# Patient Record
Sex: FEMALE | Race: WHITE | NOT HISPANIC OR LATINO | URBAN - METROPOLITAN AREA
[De-identification: names, ages, dates, MRNs, and addresses within clinical notes are randomized per-mention and may not be internally consistent; named-entity substitution may affect disease eponyms.]

---

## 2020-07-28 ENCOUNTER — OFFICE VISIT (OUTPATIENT)
Dept: FAMILY MEDICINE | Facility: CLINIC | Age: 30
End: 2020-07-28
Payer: COMMERCIAL

## 2020-07-28 VITALS
OXYGEN SATURATION: 98 % | BODY MASS INDEX: 43.78 KG/M2 | HEART RATE: 94 BPM | DIASTOLIC BLOOD PRESSURE: 93 MMHG | WEIGHT: 223 LBS | HEIGHT: 60 IN | SYSTOLIC BLOOD PRESSURE: 127 MMHG

## 2020-07-28 DIAGNOSIS — C73 THYROID CANCER (H): ICD-10-CM

## 2020-07-28 DIAGNOSIS — R41.840 LACK OF CONCENTRATION: ICD-10-CM

## 2020-07-28 DIAGNOSIS — R45.89 SADNESS: ICD-10-CM

## 2020-07-28 DIAGNOSIS — E89.0 HYPOTHYROIDISM ASSOCIATED WITH SURGICAL PROCEDURE: Primary | ICD-10-CM

## 2020-07-28 DIAGNOSIS — F41.9 ANXIETY: ICD-10-CM

## 2020-07-28 DIAGNOSIS — R41.3 LOSS OF MEMORY: ICD-10-CM

## 2020-07-28 RX ORDER — PHENOL 1.4 %
AEROSOL, SPRAY (ML) MUCOUS MEMBRANE
COMMUNITY
Start: 2019-01-10 | End: 2020-08-10

## 2020-07-28 RX ORDER — ALBUTEROL SULFATE 90 UG/1
2 AEROSOL, METERED RESPIRATORY (INHALATION)
COMMUNITY
Start: 2018-08-13

## 2020-07-28 RX ORDER — CALCITRIOL 0.25 UG/1
CAPSULE, LIQUID FILLED ORAL
COMMUNITY
Start: 2020-07-21 | End: 2020-08-10

## 2020-07-28 ASSESSMENT — PAIN SCALES - GENERAL: PAINLEVEL: EXTREME PAIN (8)

## 2020-07-28 ASSESSMENT — MIFFLIN-ST. JEOR: SCORE: 1658.02

## 2020-07-28 NOTE — PROGRESS NOTES
HPI       Wanda Swanson is a 29 year old woman who presents with a history of thyroid cancer. She stopped the Synthroid 4 days ago, memory, focus, depression and anxiety have improved. She feels that these symptoms were related to her medication. Last Thyroid labs in February, 2020; TSH 0.15 and Free T4 1.5. She presents for discussion and to have a referral to Endocrinology.   Chief Complaint   Patient presents with     Endocrine Problem     Requesting referral to Endo.     Recheck Medication     Pt is having side of effect after taking synthroid med.     Problem, Medication and Allergy Lists were reviewed and updated if needed..    Patient is an established patient of this clinic..           Review of Systems:   Review of Systems     Constitutional:  Negative for fever, chills and fatigue.   Psychiatric/Behavioral:  Positive for depression and decreased concentration.                Physical Exam:     Vitals:    07/28/20 1559   BP: (!) 127/93   Pulse: 94   SpO2: 98%   Weight: 101.2 kg (223 lb)   Height: 1.524 m (5')     Body mass index is 43.55 kg/m .  Vitals were reviewed and were normal     Physical Exam  Vitals signs reviewed.   Constitutional:       Appearance: Normal appearance.   HENT:      Head: Normocephalic.   Musculoskeletal: Normal range of motion.   Skin:     General: Skin is warm and dry.   Neurological:      General: No focal deficit present.      Mental Status: She is alert and oriented to person, place, and time.   Psychiatric:         Mood and Affect: Mood normal.         Behavior: Behavior normal.         Results:     No diagnostics ordered today.     Assessment and Plan     1. Hypothyroidism associated with surgical procedure    - ENDOCRINOLOGY ADULT REFERRAL    2. Thyroid cancer (H)    - ENDOCRINOLOGY ADULT REFERRAL    3. Loss of memory      4. Lack of concentration      5. Sadness      6. Anxiety    There are no discontinued medications.  First, Wanda will see Endocrinology in consult.  Next, she will follow up as needed. Options for treatment and follow-up care were reviewed with the patient. Wanda Swanson  engaged in the decision making process and verbalized understanding of the options discussed and agreed with the final plan.  AQUILINO Chandra, CNP

## 2020-07-28 NOTE — NURSING NOTE
Chief Complaint   Patient presents with     Endocrine Problem     Requesting referral to Endo.     Recheck Medication     Pt is having side of effect after taking synthroid med.     Depression Response    Patient completed the PHQ-9 assessment for depression and scored >9? No  Question 9 on the PHQ-9 was positive for suicidality? No    I personally notified the following: visit provider     SYLVIE Austin 4:07 PM  7/28/2020

## 2020-07-28 NOTE — PATIENT INSTRUCTIONS

## 2020-07-31 NOTE — TELEPHONE ENCOUNTER
RECORDS RECEIVED FROM:    DATE RECEIVED: 8.10.20    NOTES (FOR ALL VISITS) STATUS DETAILS   OFFICE NOTES from referring provider Internal  alvarado Mullen    OFFICE NOTES from other specialist ce paramjit- yumiko espinosa 7.29.19   ED NOTES na    OPERATIVE REPORT  (thyroid, pituitary, adrenal, parathyroid)     MEDICATION LIST Internal     IMAGING      DEXASCAN na    MRI (BRAIN) na    XR (Chest) ce [facility]- 8.18.18    CT (HEAD/NECK/CHEST/ABDOMEN) na    NUCLEAR  na    ULTRASOUND (HEAD/NECK) ce [facility]- 8.8.19, 8.27.18    LABS     DIABETES: HBGA1C, CREATININE, FASTING LIPIDS, MICROALBUMIN URINE, POTASSIUM, TSH, T4    THYROID: TSH, T4, CBC, THYRODLONULIN, TOTAL T3, FREE T4, CALCITONIN, CEA Internal/CE TSH- 2.11.20   T4- 2.11.20  Cbc-8.27.19  THYRODLONULIN 9.3.19

## 2020-08-03 ASSESSMENT — ENCOUNTER SYMPTOMS
FATIGUE: 0
FEVER: 0
DEPRESSION: 1
CHILLS: 0
NERVOUS/ANXIOUS: 1
DECREASED CONCENTRATION: 1

## 2020-08-06 RX ORDER — LEVOTHYROXINE SODIUM 150 UG/1
150 TABLET ORAL DAILY
COMMUNITY
Start: 2020-02-18

## 2020-08-06 RX ORDER — OMEPRAZOLE 40 MG/1
40 CAPSULE, DELAYED RELEASE ORAL DAILY
COMMUNITY
Start: 2019-05-06 | End: 2020-08-10

## 2020-08-06 NOTE — PROGRESS NOTES
"Wanda Swanson is a 29 year old female who is being evaluated via a billable video visit.      The patient has been notified of following:     \"This video visit will be conducted via a call between you and your physician/provider. We have found that certain health care needs can be provided without the need for an in-person physical exam.  This service lets us provide the care you need with a video conversation.  If a prescription is necessary we can send it directly to your pharmacy.  If lab work is needed we can place an order for that and you can then stop by our lab to have the test done at a later time.    Video visits are billed at different rates depending on your insurance coverage.  Please reach out to your insurance provider with any questions.    If during the course of the call the physician/provider feels a video visit is not appropriate, you will not be charged for this service.\"    Patient has given verbal consent for Video visit? Yes    How would you like to obtain your AVS? MyChart     Will anyone else be joining your video visit? No    Video-Visit Details    Type of service:  Video Visit    Distant Location (provider location):  [facility] ENDOCRINOLOGY     Hayley Hodge MA          "

## 2020-08-10 ENCOUNTER — PRE VISIT (OUTPATIENT)
Dept: ENDOCRINOLOGY | Facility: CLINIC | Age: 30
End: 2020-08-10

## 2020-08-10 ENCOUNTER — VIRTUAL VISIT (OUTPATIENT)
Dept: ENDOCRINOLOGY | Facility: CLINIC | Age: 30
End: 2020-08-10
Attending: NURSE PRACTITIONER
Payer: COMMERCIAL

## 2020-08-10 DIAGNOSIS — E66.01 CLASS 3 SEVERE OBESITY WITH BODY MASS INDEX (BMI) OF 40.0 TO 44.9 IN ADULT, UNSPECIFIED OBESITY TYPE, UNSPECIFIED WHETHER SERIOUS COMORBIDITY PRESENT (H): ICD-10-CM

## 2020-08-10 DIAGNOSIS — E66.813 CLASS 3 SEVERE OBESITY WITH BODY MASS INDEX (BMI) OF 40.0 TO 44.9 IN ADULT, UNSPECIFIED OBESITY TYPE, UNSPECIFIED WHETHER SERIOUS COMORBIDITY PRESENT (H): ICD-10-CM

## 2020-08-10 DIAGNOSIS — E89.0 POST-SURGICAL HYPOTHYROIDISM: ICD-10-CM

## 2020-08-10 DIAGNOSIS — Z56.9 PROBLEMS AT WORK: ICD-10-CM

## 2020-08-10 DIAGNOSIS — C73 PAPILLARY THYROID CARCINOMA (H): Primary | ICD-10-CM

## 2020-08-10 DIAGNOSIS — E89.2 HYPOPARATHYROIDISM AFTER PROCEDURE (H): ICD-10-CM

## 2020-08-10 RX ORDER — PHENOL 1.4 %
4 AEROSOL, SPRAY (ML) MUCOUS MEMBRANE DAILY
COMMUNITY
Start: 2020-08-10

## 2020-08-10 RX ORDER — CALCITRIOL 0.25 UG/1
0.5 CAPSULE, LIQUID FILLED ORAL DAILY
COMMUNITY
Start: 2020-08-10 | End: 2020-09-24

## 2020-08-10 SDOH — ECONOMIC STABILITY - INCOME SECURITY: UNSPECIFIED PROBLEMS RELATED TO EMPLOYMENT: Z56.9

## 2020-08-10 NOTE — PATIENT INSTRUCTIONS
Change calcium to one 600 mg tablet three times/day with food    Change calcitriol to 0.25 mcg twice/day    Gets labs at [facility] - my staff will contact you to help set it up

## 2020-08-10 NOTE — LETTER
"8/10/2020       RE: Wanda Swanson  [address]  [facility] [address]     Dear Colleague,    Thank you for referring your patient, Wanda Swanson, to the [facility] ENDOCRINOLOGY at [facility]. Please see a copy of my visit note below.    Wanda Swanson is a 29 year old female who is being evaluated via a billable video visit.      The patient has been notified of following:     \"This video visit will be conducted via a call between you and your physician/provider. We have found that certain health care needs can be provided without the need for an in-person physical exam.  This service lets us provide the care you need with a video conversation.  If a prescription is necessary we can send it directly to your pharmacy.  If lab work is needed we can place an order for that and you can then stop by our lab to have the test done at a later time.    Video visits are billed at different rates depending on your insurance coverage.  Please reach out to your insurance provider with any questions.    If during the course of the call the physician/provider feels a video visit is not appropriate, you will not be charged for this service.\"    Patient has given verbal consent for Video visit? Yes    How would you like to obtain your AVS? MyChart     Will anyone else be joining your video visit? No    Video-Visit Details    Type of service:  Video Visit    Distant Location (provider location):  [facility] ENDOCRINOLOGY     Hayley Hodge MA            Endocrine Consult Video visit note-   Attending Assessment/Plan :   Papillary thyroid carcinoma. She has been treated with total Tx and limited LN dissection, 131I 50 mCi.   Yearly labs to include Tg    Post surgical hypothyroidism. Treat to target TSH < 0.4 .    Hypocalcemia due to post surgical hypoparathyroidism.  Her regimen is unusual including both the calcitriol and the single daily of  calcium 2400 mg .  Treat to low normal or " "slightly below normal calcium.   I will try to reduce both the calcitriol and calcium if I can .  Change calcium to 600 tid with meals  Tissue transglutaminse antibody     Memory /work performance concern. She is having work performance issue which are not necessarily memory issue as much as transcription inaccuracy and \"zoning out\" all which she says is new since the thyroid surgery.    Recommend PCP consider sending her for neuropsych evaluation.   She is a little inappropriate in her affect.      Obesity with rapid weight gain in the last 10 years.   She has had a near 100 lbs weight gain since 2010  Consider sleep evaluation -   Labs to include cortisol and HgbA1c     Sleep interruption by frequent nocturia .    Labs to include HgbA1c    History of high TPO, + ROMEL  - As above.   TTG    Medication noncompliance is apparent in her calcium administration.  Unclear what is happening with the LT4 as she had reported stopping it on 7/28 which she did not tell me today.      Due to the COVID 19 pandemic this visit was a video visit in order to help prevent spread of infection in this high risk patient and the general population. The patient gave verbal consent for the visit today.    Start time 1600  Stop time 1625  Total time 25 minutes    Ethel Bailon MD    Chief complaint:  Wanda is a 29 year old female seen in consultation at the request of Cindy Mullen NP for hypothyroidism. I have reviewed Care Everywhere including [facility] lab reports, imaging reports and provider notes as indicated.      HISTORY OF PRESENT ILLNESS  Wanda pesents along with her  today. She notes she has had \"thyroid issues\" x 8 years.   She says she has seen 6 endocrinologist.  The record shows she has see Ligia Suggs, Loretta, Nathen Arana, Henrik  They haven't all told her the same thing.   She doesn't know what to believe. She has been having problems at work.  She wants to get the brain fog figured out.  " Her boss told her to see a doctor because they don't believe her work problem is due to the thyroid.  Her  is concerned about her memory. Her  notes she forgets what he says to her.   She is reading numerics wrong at work , such as to Read/key  as 20 and it was 70.  She has noticed this problem more in the past year, not in grade school or high school.  She sometimes zones off at work - others haven't noticed so they don't believe that is what is happeneing.       I have reviewed all TFTS available, including on care everywhere , which date to 2008.    Her thyroid course has been as follows:  12/1/11 FNAB right thyroid nodule papillary thyroid carcinoma  1/20/12 total thyroidectomy, limited CND (Dr Mayo Archer). Tumor was PCT 3.8 cm, unifocal with LV invasion.  0/2 lN negative.    3/14/12 50 mCi 131I [facility]  4/17/12 FNAB - reactive lN    She she currently on Synthroid brand. She has been on this dose 1-2 years.  She used to be on generic LT4.  She liked Synthroid 200 better - didn't have as much anxiety or memory issue then. The 7/28/2020 primary care note states she STOPPED the LT4 4 days prior.  Wanda did not report this to me today and I didn't catch this until after the appt.     Selected TFTS  9/11/2008 TSH 2.2  9/28/2008 TSH 1.77, free T4 1.3, .3  11/29/11 TSH 2.48, free T4 1.2, .3, 25OHD 7.9, PTH 63  SURGERY   3/1/12 TSH 2.62, free T4 1.6, ROMEL 34, Tg < 0.1, 25 OHD 13.2, PTH < 6  3/16/12 ROMEL 26, Tg 1.8   7/3/12 25OHD 29.3  8/3/12 ROMEL < 20, Tg < 0.1, TSH 2.93, free T4 1.53  10/18/12 25OHD 33.4  8/28/13 TA < 10, Tg < 0.1, TSH 0.1, free T4 1.38  9/15/14 ROMEL 49, ROMEL < 9.1, TSH 0.03, free T4 1.92  7/21/16 TSH 2.106, Tg 0.1, ROMEL < 1, PTH 4.5  10/10/16 TSH 12.31, free T4 1.2  8/28/17 Ca 8.1, TSH 0.13, free T4 1.7, Tg 0.1  12/20/17 ESR 36  1/7/19 PTH < 7  3/19/19 24 hour urine calcium 129 mg/24 hours, volume 1075, urine creatinine   7/22/19 Tg 0.3, ROMEL 1.8, TSH 5.58, free T4  1.21  8/27/19 25OHD 72.2  9/3/19 TSH 0.22, free T4 1.95, ROMEL 1.2, Tg 0.1  7/22/19 Tg 0.3, ROMEL 1.8, TSH 5.58, free T4 1.21  8/27/19 25OHD 72.2  9/13/19  Tg 0.1, ROMEL 1.2Ca 8.2, creatinine 0.92, TSH 0.22, free T4 1.95   9/17/19 Ca 8.3, Mg 1.9  2/11/2020 TSH 0.15, free T4 1.5, Ca 8.5  2/18/2020 B12 471, ferritin 40.5    Imaging--none of the images are on pacs.   9/11/08 thyroid US [facility]  9/28/10 thyroid US [facility]  11/1/11 thyroid US [facility]   12/1/11 FNAB [facility]  3/14/12 0.269 mCi 123I TBS 24 hour uptake 0.2%; irregular uptake thyroid bed  3/23/12 post 131I TBS : uptake right angle of mandible suspicious for lN met; residual thyroid   4/3/12  Neck US [facility]  4/17/12 FNAB   7/21/16 neck [facility]  6/18/18 brain MRI  ([facility])  Negative   8/27/18 neck [facility]  10/11/18 pelvic US [facility]   3/11/19 CT abdomen : stool ;   8/8/19 neck US [facility] normal     Weights  9/28/10 132  1/20/12 142  8/27/12 150 BMI 30.3   5/28/13 177  7/27/15 185  BMI 36.1  8/28/17 184 lbs BMI 35.9  9/17/19 221  BMI 43  7/28/2020 223#    REVIEW OF SYSTEMS  Can't say when last well  Weight gain in the last 8 years  Bedtime 830-9 PM  Up at 0615;   Sleep is interrupted - nocturia every 1-2 hours ; small voumes ; no water drinking during the night.    Don't drink that much, not thirsty;   Skin bruises easily  Heat sensitiivty x 8 years -   Gets cold flashes and getrs extremely hot.    Cardiac: panic attack feeling;  says she has reported heart racing/lshe says she felt panic  Respiratory: BURROUGHS ; can't exercise recently; endometriosis pain is alleviated by CBD oil she lst tried 3 days ago.  She has dysmenorrhea from 10 to 2 on CGD oil  GI: + all agove; always naueated; stomch always in pain x a few years. ? IBS per PCP    10 system ROS otherwise as per the HPI or negative    Past Medical History  Past Medical History:   Diagnosis Date     Endometriosis 06/2019    surgically treated 6/19     STEPHANIE (generalized anxiety disorder) 03/2019      Hypocalcemia 01/21/2012    post surgical     Hypoparathyroidism (H) 01/20/2012    post surgical     Hypovitaminosis D 2011     Panic disorder 03/2019     Papillary thyroid carcinoma (H) 01/20/2012     Post-surgical hypothyroidism 1/202012       Past Surgical History:   Procedure Laterality Date     LAPAROSCOPY DIAGNOSTIC (GYN)  06/18/2019    with destruction of endometriosis     THYROIDECTOMY  Bilateral 01/2012     Diagnostic laparoscopy with destruction of endometrioiss 6/18/19    Medications  Current Outpatient Medications   Medication Sig Dispense Refill     albuterol (PROAIR HFA/PROVENTIL HFA/VENTOLIN HFA) 108 (90 Base) MCG/ACT inhaler Inhale 2 puffs into the lungs PRN       calcitRIOL (ROCALTROL) 0.25 MCG capsule Take 2 capsules (0.5 mcg) by mouth daily       calcium carbonate (OS-DENIZ 600 MG [place]. [place]) 600 MG tablet Take 4 tablets (2,400 mg) by mouth daily       levothyroxine (SYNTHROID) 150 MCG tablet Take 150 mcg by mouth daily       She self disconitinued omeprazole.  She used to take thyroid support from [employer].  Tylenol prn  Ibuprofen prn      Allergies  Allergies   Allergen Reactions     Penicillins Unknown and Anaphylaxis     Family history       Molds & Smuts GI Disturbance     Causes nausea and can lead to vomiting        Family History  family history includes Cancer in her maternal grandmother; Cerebrovascular Disease in her maternal grandmother; Depression in her brother and father.    Social History  Social History     Tobacco Use     Smoking status: Never Smoker     Smokeless tobacco: Never Used   Substance Use Topics     Alcohol use: Yes     Drug use: None     She is working physically at [employer] work , more than just .  Errors  Eats in the morning on the way to work - donut while driving;    Physical Exam  There were no vitals taken for this visit.  There is no height or weight on file to calculate BMI.  GENERAL: no distress; she is obese;  is present and is  not obese  SKIN: Visible skin clear. No significant rash, abnormal pigmentation or lesions.  EYES: Eyes grossly normal to inspection.  No discharge or erythema, or obvious scleral/conjunctival abnormalities.  RESP: No audible wheeze, cough, or visible cyanosis.  No visible retractions or increased work of breathing.    NEURO: Cranial nerves grossly intact.  Awake, alert, responds appropriately to questions.  Mentation and speech fluent.  PSYCH:affect cheerful but possibly inappropriately so, and appearance well-groomed.    DATA REVIEW    1/20/12 SURGICAL PATHOLOGY REPORTLAB:  Phone:Case#:[id number]             Final Report  DIAGNOSIS  A) CENTRAL LYMPH NODE VERSUS PARATHYROID, BIOPSY: One lymph node negative  or malignancy (0/1  B) LYMPH NODE, RIGHT THYROID, BIOPSY: One lymph node negative formalignancy (0/1)  C) LYMPH NODE, LEFT THYRIOD, BIOPSY: One lymph node negative for malignancy(0/1)  D) THYROID, THYROIDECTOMY:  1. Papillary carcinoma  2. Tumor abuts cauterized margin focally  3. Two lymph nodes negative for malignancy (0/2)  4. One benign parathyroid  5. Lymphocytic thyroiditis  6. Please see below thyroid cancer staging parameters    E) LYMPH NODES, CENTRAL, BIOPSY: Three lymph nodes negative for malignancy (0/3)  F) LYMPH NODE, RIGHT THYROID, BIOPSY: Benign thyroid tissue    COMMENT  The dominant nodule in the right measured 0.8 x 0.8 x 0.7 cm. However,  tumor is present in blocks D1-D14 and the overall size of the tumor is  estimated to be 3.8 cm. No tumor is identified in the left thyroid.    2010 THYROID CANCER STAGING PARAMETERS  Case number: [id number]    Patient name: Wanda Madden    Final TNM: [id number]  2010 stage: I    MACROSCOPIC     SPECIMEN TYPE          Total thyroidectomy     DOMINANT TUMOR LOCATION AND SIZE          Location: Right lobe          Greatest dimension: 3.8 cm     TUMOR FOCALITY          Unifocal    MICROSCOPIC     HISTOLOGIC TYPE          Papillary carcinoma     MARGINS          " Uninvolved by invasive carcinoma          (cauterized tumor abuts inked margin)     LYMPHATIC VASCULAR INVASION          Present          Extent: Focal     EXTRATHYROIDAL EXTENSION          Not identified    PATHOLOGIC STAGING     EXTENT OF INVASION          pT2.  (Tumor more than 2 cm but not more than 4 cm, limited to the  thyroid)     REGIONAL LYMPH NODES          pN0.  (No regional lymph node metastasis)          Total nodes:              8          Total positive nodes:     0     DISTANT METASTASIS          pM0. (Not applicable)     MACIS SCORE          4.2     PATHOLOGIC STAGE Summary          Final TNM: [id number]          2010 stage: I          **  The pathologic stage presumes no distant metastasis.    Attending Pathologist:   Pelon Garcia M.D.    [facility], [email or web address]    CLINICAL INFORMATION  Papillary cancer of the thyroid (see previous pathology report [id number]).    SPECIMEN/GROSS DESCRIPTION  A) SOURCE: Central lymph node versus parathyroid  Received fresh labeled with the patient name and \"central lymph node versus  parathyroid\" is a 0.010 gram, 0.3 cm tan tissue. The tissue is entirely  frozen on one bridget and submitted in A.    INTRAOPERATIVE PATHOLOGY CONSULTATION WITH FROZEN SECTION: \"Benign lymph  node\" is rendered by Dr. Krishna.    KIMBERLY:ndm:1/20/12    B) SOURCE: Lymph node biopsy, right thyroid  Labeled with the patient's name and \"Rt thyroid lymph nodes B\" are multiple  tan-pink tissues, up to 0.5 cm and 0.7 x 0.7 x 0.3 cm in aggregate. The  specimen is entirely submitted in one cassette.    C) SOURCE: Lymph node biopsy, left thyroid  Labeled with the patient's name and \"left thyroid lymph nodes\" are two  tan-pink tissues: 0.9 x 0.8 x 0.3 cm and 1 x 0.5 x 0.3 cm. The specimen is  entirely submitted in one cassette.    D) SOURCE: Thyroid  Labeled with the patient's name and \"thyroid stitch on right superior\" is a  19 gram total thyroidectomy specimen. The specimen is " "oriented as follows:  Suture - right superior.    The specimen has dimensions as follows: Right lobe - 4.9 x 3 x 2.6 cm, left  lobe - 4.3 x 2 x 1.8 cm, isthmus - 1.6 x 1 x 0.7 cm.    The specimen is inked as follows:  Anterior right lobe -- Green  Anterior left lobe -- Blue  Anterior isthmus -- Orange  Posterior -- Black    There is a 0.8 x 0.8 x 0.7 cm gray indurated thyroid lesion involving the  mid right thyroid lobe. The thyroid lesion is 0.7 cm from the closest inked  outer surface. The remaining right thyroid lobe has a multinodular cut  surface. No other lesions are identified grossly.    The left thyroid lobe has a tan-red cut surface. The left thyroid lobe has  a vague multinodular cut surface. No discrete masses or lesions are  identified.    No parathyroid tissue is identified. No lymph nodes are identified.    The specimen is entirely submitted labeled:  D1-D17.   Entire right lobe submitted from superior to inferior (D5-D9 -            includes thyroid lesion with a composite slice in D8-D9; D10-            D11, D12-D13 - composite slices)  D18-D29.  Entire left lobe submitted from superior to inferior    E) SOURCE: Lymph node biopsy, central  Labeled with the patient's name and \"central lymph nodes\" is a 1.5 x 1 x  0.5 cm aggregate of fibrofatty tissue. There are three possible lymph  nodes, ranging from 0.4 cm to 0.6 cm.    The specimen is entirely submitted labeled:  E1.  Three possible lymph nodes  E2.  Remaining fibrofatty tissue    F) SOURCE: Lymph node biopsy, additional right thyroid lymph nodes  Labeled with the patient's name and \"F additional Rt thyroid lymph nodes\"  is a 0.6 x 0.5 x 0.3 cm red tissue. The specimen is entirely submitted in  one cassette.    This case is accessioned in [employer].    Gross dictation by: [email or web address] (B-F)    MICROSCOPIC  A-F) The microscopic appearance substantiates the diagnosis. Dictation by:  ALTAGRACIA; transcribed by:vernon [email or web address]    Interpreted at [facility] (Central Lab, [facility], [facility], [facility], [facility])    COLLECTED: 01/20/12  ACCESSIONED: 01/20/12  SIGNED: 01/23/12 16:57    CYTOPATHOLOGY REPORT  LAB:  [facility]  Phone:  [phone]     Fax: [phone]    Case#:[id number]               Final Report      4/17/12 DIAGNOSIS  LYMPH NODE, RIGHT CERVICAL AT ANGLE OF MANDIBLE, ULTRASOUND GUIDED FINE  NEEDLE ASPIRATION:  1. Reactive-appearing lymph node  2. Negative for metastatic carcinoma in this sampling  Attending Pathologist:   Alesha Stoner M.D  [facility], [email or web address]  CLINICAL INFORMATION  21-year-old woman with a total thyroidectomy earlier this year forpapillary carcinoma ([id number], 1/20/12) now has a nuclear medicine studydemonstrating some I131 uptake at the right angle of the mandible,suspicious for a lymph node metastasis of thyroid carcinoma.  SPECIMEN/GROSS DESCRIPTION  A) SOURCE: FNA, radiologic - guided, rt l.n @ angle of mandibleDr. Hassan performed the procedure, with assistance from Cindy Grayson. Thisyielded 16 Diff-Quik stained direct smears and material in CytoLyt fixative, used to prepare a single Pap stained ThinPrep slide.  MICROSCOPICSpecimen adequacy: Adequate for interpretation. The smears demonstrateabundant lymph node elements, with frequent lymphohistiocytic tangles, consistent with germinal centers. The smears are screened for evidence of  metastatic carcinoma and this is not identified.  Dictation by: MPC;transcribed by:carolee 4/20/1  COLLECTED: 04/17/12  ACCESSIONED: 04/19/12  SIGNED: 04/20/12 14:30\      12/1/11  CYTOPATHOLOGY REPORT  LAB:  [facility]  Phone:  [phone]     Fax: [phone]    Case#:[id number]               Final Report    DIAGNOSIS    THYROID, RIGHT LOBE, ULTRASOUND GUIDED FINE NEEDLE ASPIRATION:  1. Papillary carcinoma  2. See microscopic description and comment    Attending Pathologist:   Stevan TYSON  Lefty SHORE    [facility], [email or web address]    CLINICAL INFORMATION  This is a 21-year old female with a right thyroid nodule.    SPECIMEN/GROSS DESCRIPTION  A) SOURCE: FNA, radiologic - guided, right thyroid  Received are five air dried slides, stained with Diff-Quik stain and one  ThinPrep Papanicolaou stained slide prepared from needle rinsings and  Cytolyt fluid. The procedure is performed by Dr. Cutler.    MICROSCOPIC  Specimen adequacy: Adequate for interpretation.  The microscopic appearance substantiates the diagnosis. Dictation by: TM;  transcribed by: sb:12/2/11    [facility] at [facility] (Central Lab, [facility], [facility], [facility], [facility]    COLLECTED: 12/01/11  ACCESSIONED: 12/02/11  SIGNED: 12/02/11 15:01         NM THYROID CA METS SCAN WHOLE BODY3/23/2012  [facility]  Result Impression     1. Successful localization and residual thyroid for ablation.  2. Uptake in a right angle of the mandible nodule suspicious for a lymph   node metastasis. Clinical and ultrasound follow up recommended.     Saud Gray Jr., M.D., Ph.D.  Nuclear Medicine, Breast Imaging  [facility]  [email or web address]   RRB/ilanam  D:3/22/2012/T:3/22/2012   Result Narrative   THYROID CANCER WHOLE BODY SCAN POST I-131 THERAPY AND SPECT TUMOR   LOCALIZATION 3/21/2012     COMPARISON: Pretreatment whole body I-123 imaging 3/14/2012.    INDICATION: Thyroid carcinoma post thyroidectomy, I-131 ablation therapy   performed on 3/14/2012 and I-131 post-therapy whole body imaging performed   at one week post dose administration on 3/21/2012 for staging.     TECHNIQUE: One week post I-131 ablation therapy imaging with anterior   whole body and lateral head and neck images. Due to the findings on the   planar images, patient returned to the department on 3/22/2012 and SPECT   imaging of the neck performed for tumor localization  with CT attenuation   correction to improve sensitivity and SPECT/CT image fusion for functional   anatomic correlation.     FINDINGS: Planar imaging shows some accumulation in the thyroid bed as   desired for ablation of any remnant thyroid tissue. There is focal   accumulation in the right neck above the thyroid bed. This area was not   included on the pinhole imaging of the pretreatment study. This is at the   level of the submandibular gland; however, on the lateral view, this is   more posteriorly positioned and, therefore, SPECT imaging is required for   further localization. On the planar whole body images, there is otherwise   activity in the liver, suggesting uptake by some residual functioning   thyroid tissue, and no other focal abnormal accumulation to suggest   distant metastases.     SPECT imaging on 3/22/2012 with low-dose CT fusion shows the focal   accumulation to lie just posterior and deep to the angle of the mandible   along the anterior margin of the sternocleidomastoid muscle. Some   asymmetric soft tissue density is suggested on the CT localizer images.   This could represent a metastasis to a level-2 cervical lymph node just   above the level of the hyoid bone. clinical attention is drawn to this   area for a possible palpable node, and ultrasound could be utilized for   further imaging characterization. No other focal abnormal accumulation   identified.      US NECK OR HEAD SOFT TISSUE8/8/2019  [facility]  Other Result Information   This result has an attachment that is not available.   Result Narrative     Clinical History: Localized Swelling, Mass And Lump, Neck    Technique: Ultrasound of the neck.    Comparison: None.    Findings: Normal neck structures are seen. No masses or enlarged nodes are seen. Scattered normal-appearing lymph nodes are present.    Impression: Normal neck ultrasound.       Again, thank you for allowing me to participate in the care  of your patient.      Sincerely,    Ethel Bailon MD

## 2020-08-10 NOTE — PROGRESS NOTES
"Endocrine Consult Video visit note-   Attending Assessment/Plan :   Papillary thyroid carcinoma. She has been treated with total Tx and limited LN dissection, 131I 50 mCi.   Yearly labs to include Tg    Post surgical hypothyroidism. Treat to target TSH < 0.4 .  Addendum : 8/20/2020 TFTS not at target.      Hypocalcemia due to post surgical hypoparathyroidism.  Her regimen is unusual including both the calcitriol and the single daily of  calcium 2400 mg .  Treat to low normal or slightly below normal calcium.   I will try to reduce both the calcitriol and calcium if I can .  Change calcium to 600 tid with meals  Tissue transglutaminse antibody   Addendum: 8/20/2020 calcium normal supports we can reduce treatment some.  Will start by reducing calcitriol from 0.5 to 0.25 mcg/day.     Memory /work performance concern. She is having work performance issue which are not necessarily memory issue as much as transcription inaccuracy and \"zoning out\" all which she says is new since the thyroid surgery.    Recommend PCP consider sending her for neuropsych evaluation.   She is a little inappropriate in her affect.      Obesity with rapid weight gain in the last 10 years.   She has had a near 100 lbs weight gain since 2010  Consider sleep evaluation -   Labs to include cortisol and HgbA1c     Sleep interruption by frequent nocturia .    Labs to include HgbA1c    History of high TPO, + ROMEL  - As above.   TTG    Medication noncompliance is apparent in her calcium administration.  Unclear what is happening with the LT4 as she had reported stopping it on 7/28 which she did not tell me today.      Due to the COVID 19 pandemic this visit was a video visit in order to help prevent spread of infection in this high risk patient and the general population. The patient gave verbal consent for the visit today.    Start time 1600  Stop time 1625  Total time 25 minutes    Ethel Bailon MD    Chief complaint:  Wanda is a 29 year old " "female seen in consultation at the request of Cindy Mullen NP for hypothyroidism. I have reviewed Care Everywhere including [facility], [facility] lab reports, imaging reports and provider notes as indicated.      HISTORY OF PRESENT ILLNESS  Wanda fox along with her  today. She notes she has had \"thyroid issues\" x 8 years.   She says she has seen 6 endocrinologist.  The record shows she has see Dr Hartman ,  Ligia, Loretta, Sumit, Nathen, Henrik  They haven't all told her the same thing.   She doesn't know what to believe. She has been having problems at work.  She wants to get the brain fog figured out.  Her boss told her to see a doctor because they don't believe her work problem is due to the thyroid.  Her  is concerned about her memory. Her  notes she forgets what he says to her.   She is reading numerics wrong at work , such as to Read/key  as 20 and it was 70.  She has noticed this problem more in the past year, not in grade school or high school.  She sometimes zones off at work - others haven't noticed so they don't believe that is what is happeneing.       I have reviewed all TFTS available, including on care everywhere , which date to 2008.    Her thyroid course has been as follows:  12/1/11 FNAB right thyroid nodule papillary thyroid carcinoma  1/20/12 total thyroidectomy, limited CND (Dr Mayo Archer). Tumor was PCT 3.8 cm, unifocal with LV invasion.  0/2 lN negative.    3/14/12 50 mCi 131I [facility]  4/17/12 FNAB - reactive lN    She she currently on Synthroid brand. She has been on this dose 1-2 years.  She used to be on generic LT4.  She liked Synthroid 200 better - didn't have as much anxiety or memory issue then. The 7/28/2020 primary care note states she STOPPED the LT4 4 days prior.  Wanda did not report this to me today and I didn't catch this until after the appt.     Selected TFTS  9/11/2008 TSH 2.2  9/28/2008 TSH 1.77, free T4 1.3, .3  11/29/11 TSH 2.48, free " T4 1.2, .3, 25OHD 7.9, PTH 63  SURGERY   3/1/12 TSH 2.62, free T4 1.6, ROMEL 34, Tg < 0.1, 25 OHD 13.2, PTH < 6  3/16/12 ROMEL 26, Tg 1.8   7/3/12 25OHD 29.3  8/3/12 ROMEL < 20, Tg < 0.1, TSH 2.93, free T4 1.53  10/18/12 25OHD 33.4  8/28/13 TA < 10, Tg < 0.1, TSH 0.1, free T4 1.38  9/15/14 ROMEL 49, ROMEL < 9.1, TSH 0.03, free T4 1.92  7/21/16 TSH 2.106, Tg 0.1, ROMEL < 1, PTH 4.5  10/10/16 TSH 12.31, free T4 1.2  8/28/17 Ca 8.1, TSH 0.13, free T4 1.7, Tg 0.1  12/20/17 ESR 36  1/7/19 PTH < 7  3/19/19 24 hour urine calcium 129 mg/24 hours, volume 1075, urine creatinine   7/22/19 Tg 0.3, ROMEL 1.8, TSH 5.58, free T4 1.21  8/27/19 25OHD 72.2  9/3/19 TSH 0.22, free T4 1.95, ROMEL 1.2, Tg 0.1  7/22/19 Tg 0.3, ROMEL 1.8, TSH 5.58, free T4 1.21  8/27/19 25OHD 72.2  9/13/19  Tg 0.1, ROMEL 1.2Ca 8.2, creatinine 0.92, TSH 0.22, free T4 1.95   9/17/19 Ca 8.3, Mg 1.9  2/11/2020 TSH 0.15, free T4 1.5, Ca 8.5  2/18/2020 B12 471, ferritin 40.5  8/20/2020 Tg < 0.1, ROMEL < 0.4, TSH 1.71, free T4 1.57, cortisol 19.2, HgbA1c 5.4%, CA 9.2, phos 3.3, creatinine 1.06- results follwoed appt.     Imaging--none of the images are on pacs.   9/11/08 thyroid US [facility]  9/28/10 thyroid US [facility]  11/1/11 thyroid US [facility]   12/1/11 FNAB [facility]  3/14/12 0.269 mCi 123I TBS 24 hour uptake 0.2%; irregular uptake thyroid bed  3/23/12 post 131I TBS : uptake right angle of mandible suspicious for lN met; residual thyroid   4/3/12  Neck US [facility]  4/17/12 FNAB   7/21/16 neck US [facility]  6/18/18 brain MRI  ([facility])  Negative   8/27/18 neck [facility]  10/11/18 pelvic US [facility]   3/11/19 CT abdomen : stool ;   8/8/19 neck US [facility] normal     Weights  9/28/10 132  1/20/12 142  8/27/12 150 BMI 30.3   5/28/13 177  7/27/15 185  BMI 36.1  8/28/17 184 lbs BMI 35.9  9/17/19 221  BMI 43  7/28/2020 223#    REVIEW OF SYSTEMS  Can't say when last well  Weight gain in the last 8 years  Bedtime 830-9 PM  Up at 0615;   Sleep is interrupted - nocturia every 1-2 hours ; small  voumes ; no water drinking during the night.    Don't drink that much, not thirsty;   Skin bruises easily  Heat sensitiivty x 8 years -   Gets cold flashes and getrs extremely hot.    Cardiac: panic attack feeling;  says she has reported heart racing/lshe says she felt panic  Respiratory: BURROUGHS ; can't exercise recently; endometriosis pain is alleviated by CBD oil she lst tried 3 days ago.  She has dysmenorrhea from 10 to 2 on CGD oil  GI: + all agove; always naueated; stomch always in pain x a few years. ? IBS per PCP    10 system ROS otherwise as per the HPI or negative    Past Medical History  Past Medical History:   Diagnosis Date     Endometriosis 06/2019    surgically treated 6/19     STEPHANIE (generalized anxiety disorder) 03/2019     Hypocalcemia 01/21/2012    post surgical     Hypoparathyroidism (H) 01/20/2012    post surgical     Hypovitaminosis D 2011     Panic disorder 03/2019     Papillary thyroid carcinoma (H) 01/20/2012     Post-surgical hypothyroidism 1/202012       Past Surgical History:   Procedure Laterality Date     LAPAROSCOPY DIAGNOSTIC (GYN)  06/18/2019    with destruction of endometriosis     THYROIDECTOMY  Bilateral 01/2012     Diagnostic laparoscopy with destruction of endometrioiss 6/18/19    Medications  Current Outpatient Medications   Medication Sig Dispense Refill     albuterol (PROAIR HFA/PROVENTIL HFA/VENTOLIN HFA) 108 (90 Base) MCG/ACT inhaler Inhale 2 puffs into the lungs PRN       calcitRIOL (ROCALTROL) 0.25 MCG capsule Take 2 capsules (0.5 mcg) by mouth daily       calcium carbonate (OS-DENIZ 600 MG [employer] [place] [place]) 600 MG tablet Take 4 tablets (2,400 mg) by mouth daily       levothyroxine (SYNTHROID) 150 MCG tablet Take 150 mcg by mouth daily       She self disconitinued omeprazole.  She used to take thyroid support from [employer].  Tylenol prn  Ibuprofen prn      Allergies  Allergies   Allergen Reactions     Penicillins Unknown and Anaphylaxis     Family history       M[employer] Sm[employer] GI  Disturbance     Causes nausea and can lead to vomiting        Family History  family history includes Cancer in her maternal grandmother; Cerebrovascular Disease in her maternal grandmother; Depression in her brother and father.    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  Errors  Eats in the morning on the way to work - donut while driving;    Physical Exam  There were no vitals taken for this visit.  There is no height or weight on file to calculate BMI.  GENERAL: no distress; she is obese;  is present and is not obese  SKIN: Visible skin clear. No significant rash, abnormal pigmentation or lesions.  EYES: Eyes grossly normal to inspection.  No discharge or erythema, or obvious scleral/conjunctival abnormalities.  RESP: No audible wheeze, cough, or visible cyanosis.  No visible retractions or increased work of breathing.    NEURO: Cranial nerves grossly intact.  Awake, alert, responds appropriately to questions.  Mentation and speech fluent.  PSYCH:affect cheerful but possibly inappropriately so, and appearance well-groomed.    DATA REVIEW    Results for CAT TERRELL (MRN [id number]) as of 8/21/2020 11:54   Ref. Range 8/20/2020 08:28   Creatinine Latest Ref Range: 0.52 - 1.04 mg/dL 1.06 (H)   GFR Estimate Latest Ref Range: >60 mL/min/1.73_m2 70   GFR Estimate If Black Latest Ref Range: >60 mL/min/1.73_m2 82   Calcium Latest Ref Range: 8.5 - 10.1 mg/dL 9.2   Phosphorus Latest Ref Range: 2.5 - 4.5 mg/dL 3.3   Hemoglobin A1C Latest Ref Range: 0 - 5.6 % 5.4   Cortisol Serum Latest Ref Range: 4 - 22 ug/dL 19.2   T4 Free Latest Ref Range: 0.76 - 1.46 ng/dL 1.57 (H)   Tissue Transglutaminase Antibody IgA Latest Ref Range: <7 U/mL 1   Tissue Transglutaminase Nella IgG Latest Ref Range: <7  U/mL <1   TSH Latest Ref Range: 0.40 - 4.00 mU/L 1.71     1/20/12 SURGICAL PATHOLOGY REPORTLAB:  Phone:Case#:[id number]             Final Report  DIAGNOSIS  A) CENTRAL LYMPH NODE VERSUS PARATHYROID, BIOPSY: One lymph node negative  or malignancy (0/1  B) LYMPH NODE, RIGHT THYROID, BIOPSY: One lymph node negative formalignancy (0/1)  C) LYMPH NODE, LEFT THYRIOD, BIOPSY: One lymph node negative for malignancy(0/1)  D) THYROID, THYROIDECTOMY:  1. Papillary carcinoma  2. Tumor abuts cauterized margin focally  3. Two lymph nodes negative for malignancy (0/2)  4. One benign parathyroid  5. Lymphocytic thyroiditis  6. Please see below thyroid cancer staging parameters    E) LYMPH NODES, CENTRAL, BIOPSY: Three lymph nodes negative for malignancy (0/3)  F) LYMPH NODE, RIGHT THYROID, BIOPSY: Benign thyroid tissue    COMMENT  The dominant nodule in the right measured 0.8 x 0.8 x 0.7 cm. However,  tumor is present in blocks D1-D14 and the overall size of the tumor is  estimated to be 3.8 cm. No tumor is identified in the left thyroid.    2010 THYROID CANCER STAGING PARAMETERS  Case number: [id number]    Patient name: Wanda Madden    Final TNM: [id number]  2010 stage: I    MACROSCOPIC     SPECIMEN TYPE          Total thyroidectomy     DOMINANT TUMOR LOCATION AND SIZE          Location: Right lobe          Greatest dimension: 3.8 cm     TUMOR FOCALITY          Unifocal    MICROSCOPIC     HISTOLOGIC TYPE          Papillary carcinoma     MARGINS          Uninvolved by invasive carcinoma          (cauterized tumor abuts inked margin)     LYMPHATIC VASCULAR INVASION          Present          Extent: Focal     EXTRATHYROIDAL EXTENSION          Not identified    PATHOLOGIC STAGING     EXTENT OF INVASION          pT2.  (Tumor more than 2 cm but not more than 4 cm, limited to the  thyroid)     REGIONAL LYMPH NODES          pN0.  (No regional lymph node metastasis)          Total nodes:              8          Total positive nodes:      "0     DISTANT METASTASIS          pM0. (Not applicable)     MACIS SCORE          4.2     PATHOLOGIC STAGE Summary          Final TNM: [id number]          2010 stage: I          **  The pathologic stage presumes no distant metastasis.    Attending Pathologist:   Pelon Garcia M.D.    [facility], [email or web address]    CLINICAL INFORMATION  Papillary cancer of the thyroid (see previous pathology report [id number]).    SPECIMEN/GROSS DESCRIPTION  A) SOURCE: Central lymph node versus parathyroid  Received fresh labeled with the patient name and \"central lymph node versus  parathyroid\" is a 0.010 gram, 0.3 cm tan tissue. The tissue is entirely  frozen on one bridget and submitted in A.    INTRAOPERATIVE PATHOLOGY CONSULTATION WITH FROZEN SECTION: \"Benign lymph  node\" is rendered by Dr. Krishna.    KIMBERLY:rachel:1/20/12    B) SOURCE: Lymph node biopsy, right thyroid  Labeled with the patient's name and \"Rt thyroid lymph nodes B\" are multiple  tan-pink tissues, up to 0.5 cm and 0.7 x 0.7 x 0.3 cm in aggregate. The  specimen is entirely submitted in one cassette.    C) SOURCE: Lymph node biopsy, left thyroid  Labeled with the patient's name and \"left thyroid lymph nodes\" are two  tan-pink tissues: 0.9 x 0.8 x 0.3 cm and 1 x 0.5 x 0.3 cm. The specimen is  entirely submitted in one cassette.    D) SOURCE: Thyroid  Labeled with the patient's name and \"thyroid stitch on right superior\" is a  19 gram total thyroidectomy specimen. The specimen is oriented as follows:  Suture - right superior.    The specimen has dimensions as follows: Right lobe - 4.9 x 3 x 2.6 cm, left  lobe - 4.3 x 2 x 1.8 cm, isthmus - 1.6 x 1 x 0.7 cm.    The specimen is inked as follows:  Anterior right lobe -- Green  Anterior left lobe -- Blue  Anterior isthmus -- Orange  Posterior -- Black    There is a 0.8 x 0.8 x 0.7 cm gray indurated thyroid lesion involving the  mid right thyroid lobe. The thyroid lesion is 0.7 cm from the closest inked  outer " "surface. The remaining right thyroid lobe has a multinodular cut  surface. No other lesions are identified grossly.    The left thyroid lobe has a tan-red cut surface. The left thyroid lobe has  a vague multinodular cut surface. No discrete masses or lesions are  identified.    No parathyroid tissue is identified. No lymph nodes are identified.    The specimen is entirely submitted labeled:  D1-D17.   Entire right lobe submitted from superior to inferior (D5-D9 -            includes thyroid lesion with a composite slice in D8-D9; D10-            D11, D12-D13 - composite slices)  D18-D29.  Entire left lobe submitted from superior to inferior    E) SOURCE: Lymph node biopsy, central  Labeled with the patient's name and \"central lymph nodes\" is a 1.5 x 1 x  0.5 cm aggregate of fibrofatty tissue. There are three possible lymph  nodes, ranging from 0.4 cm to 0.6 cm.    The specimen is entirely submitted labeled:  E1.  Three possible lymph nodes  E2.  Remaining fibrofatty tissue    F) SOURCE: Lymph node biopsy, additional right thyroid lymph nodes  Labeled with the patient's name and \"F additional Rt thyroid lymph nodes\"  is a 0.6 x 0.5 x 0.3 cm red tissue. The specimen is entirely submitted in  one cassette.    This case is accessioned in [employer].    Gross dictation by: DEJ:[email or web address] (B-F)    MICROSCOPIC  A-F) The microscopic appearance substantiates the diagnosis. Dictation by:  ALTAGRACIA; transcribed by:vernon 2012    Interpreted at [facility] (Central Lab, [facility], [facility], [facility], [facility])    COLLECTED: 12  ACCESSIONED: 12  SIGNED: 12 16:57    CYTOPATHOLOGY REPORT  LAB:  [facility]OR[facility]  Phone:  [phone]     Fax: [phone]    Case#:[id number]               Final Report      12 DIAGNOSIS  LYMPH NODE, RIGHT CERVICAL AT ANGLE OF MANDIBLE, ULTRASOUND GUIDED FINE  NEEDLE ASPIRATION:  1. Reactive-appearing lymph node  2. " Negative for metastatic carcinoma in this sampling  Attending Pathologist:   Alesha Stoner M.D  [facility], [email or web address]  CLINICAL INFORMATION  21-year-old woman with a total thyroidectomy earlier this year forpapillary carcinoma ([id number], 1/20/12) now has a nuclear medicine studydemonstrating some I131 uptake at the right angle of the mandible,suspicious for a lymph node metastasis of thyroid carcinoma.  SPECIMEN/GROSS DESCRIPTION  A) SOURCE: FNA, radiologic - guided, rt l.n @ angle of mandibleDr. Hassan performed the procedure, with assistance from Cindy Grayson. Thisyielded 16 Diff-Quik stained direct smears and material in CytoLyt fixative, used to prepare a single Pap stained ThinPrep slide.  MICROSCOPICSpecimen adequacy: Adequate for interpretation. The smears demonstrateabundant lymph node elements, with frequent lymphohistiocytic tangles, consistent with germinal centers. The smears are screened for evidence of  metastatic carcinoma and this is not identified.  Dictation by: MPC;transcribed by:asjose francisco 4/20/1  COLLECTED: 04/17/12  ACCESSIONED: 04/19/12  SIGNED: 04/20/12 14:30\      12/1/11  CYTOPATHOLOGY REPORT  LAB:  [facility]ORAT[facility]  Phone:  [phone]     Fax: [phone]    Case#:[id number]               Final Report    DIAGNOSIS    THYROID, RIGHT LOBE, ULTRASOUND GUIDED FINE NEEDLE ASPIRATION:  1. Papillary carcinoma  2. See microscopic description and comment    Attending Pathologist:   Stevan Olea M.D.    [facility], [email or web address]    CLINICAL INFORMATION  This is a 21-year old female with a right thyroid nodule.    SPECIMEN/GROSS DESCRIPTION  A) SOURCE: FNA, radiologic - guided, right thyroid  Received are five air dried slides, stained with Diff-Quik stain and one  ThinPrep Papanicolaou stained slide prepared from needle rinsings and  Cytolyt fluid. The procedure is performed by Dr. Cutler.    MICROSCOPIC  Specimen adequacy: Adequate for  interpretation.  The microscopic appearance substantiates the diagnosis. Dictation by: TM;  transcribed by: sb:12/2/11    Interpreted at [facility] (Central Lab, [facility], [facility], [facility], [facility]    COLLECTED: 12/01/11  ACCESSIONED: 12/02/11  SIGNED: 12/02/11 15:01         NM THYROID CA METS SCAN WHOLE BODY3/23/2012  [facility]  Result Impression     1. Successful localization and residual thyroid for ablation.  2. Uptake in a right angle of the mandible nodule suspicious for a lymph   node metastasis. Clinical and ultrasound follow up recommended.     Saud Gray Jr., M.D., Ph.D.  Nuclear Medicine, Breast Imaging  [facility]  [email or web address]   YUSUF/annette  D:3/22/2012/T:3/22/2012   Result Narrative   THYROID CANCER WHOLE BODY SCAN POST I-131 THERAPY AND SPECT TUMOR   LOCALIZATION 3/21/2012     COMPARISON: Pretreatment whole body I-123 imaging 3/14/2012.    INDICATION: Thyroid carcinoma post thyroidectomy, I-131 ablation therapy   performed on 3/14/2012 and I-131 post-therapy whole body imaging performed   at one week post dose administration on 3/21/2012 for staging.     TECHNIQUE: One week post I-131 ablation therapy imaging with anterior   whole body and lateral head and neck images. Due to the findings on the   planar images, patient returned to the department on 3/22/2012 and SPECT   imaging of the neck performed for tumor localization with CT attenuation   correction to improve sensitivity and SPECT/CT image fusion for functional   anatomic correlation.     FINDINGS: Planar imaging shows some accumulation in the thyroid bed as   desired for ablation of any remnant thyroid tissue. There is focal   accumulation in the right neck above the thyroid bed. This area was not   included on the pinhole imaging of the pretreatment study. This is at the   level of the submandibular gland; however, on the lateral view,  this is   more posteriorly positioned and, therefore, SPECT imaging is required for   further localization. On the planar whole body images, there is otherwise   activity in the liver, suggesting uptake by some residual functioning   thyroid tissue, and no other focal abnormal accumulation to suggest   distant metastases.     SPECT imaging on 3/22/2012 with low-dose CT fusion shows the focal   accumulation to lie just posterior and deep to the angle of the mandible   along the anterior margin of the sternocleidomastoid muscle. Some   asymmetric soft tissue density is suggested on the CT localizer images.   This could represent a metastasis to a level-2 cervical lymph node just   above the level of the hyoid bone. clinical attention is drawn to this   area for a possible palpable node, and ultrasound could be utilized for   further imaging characterization. No other focal abnormal accumulation   identified.      US NECK OR HEAD SOFT TISSUE8/8/2019  [facility]  Other Result Information   This result has an attachment that is not available.   Result Narrative     Clinical History: Localized Swelling, Mass And Lump, Neck    Technique: Ultrasound of the neck.    Comparison: None.    Findings: Normal neck structures are seen. No masses or enlarged nodes are seen. Scattered normal-appearing lymph nodes are present.    Impression: Normal neck ultrasound.

## 2020-08-20 DIAGNOSIS — E89.0 POST-SURGICAL HYPOTHYROIDISM: ICD-10-CM

## 2020-08-20 DIAGNOSIS — C73 PAPILLARY THYROID CARCINOMA (H): ICD-10-CM

## 2020-08-20 DIAGNOSIS — E89.2 HYPOPARATHYROIDISM AFTER PROCEDURE (H): ICD-10-CM

## 2020-08-20 LAB
CALCIUM SERPL-MCNC: 9.2 MG/DL (ref 8.5–10.1)
CORTIS SERPL-MCNC: 19.2 UG/DL (ref 4–22)
CREAT SERPL-MCNC: 1.06 MG/DL (ref 0.52–1.04)
GFR SERPL CREATININE-BSD FRML MDRD: 70 ML/MIN/{1.73_M2}
HBA1C MFR BLD: 5.4 % (ref 0–5.6)
PHOSPHATE SERPL-MCNC: 3.3 MG/DL (ref 2.5–4.5)
T4 FREE SERPL-MCNC: 1.57 NG/DL (ref 0.76–1.46)
TSH SERPL DL<=0.005 MIU/L-ACNC: 1.71 MU/L (ref 0.4–4)

## 2020-08-20 PROCEDURE — 84439 ASSAY OF FREE THYROXINE: CPT

## 2020-08-20 PROCEDURE — 83036 HEMOGLOBIN GLYCOSYLATED A1C: CPT

## 2020-08-20 PROCEDURE — 82310 ASSAY OF CALCIUM: CPT

## 2020-08-20 PROCEDURE — 84100 ASSAY OF PHOSPHORUS: CPT

## 2020-08-20 PROCEDURE — 84443 ASSAY THYROID STIM HORMONE: CPT

## 2020-08-20 PROCEDURE — 36415 COLL VENOUS BLD VENIPUNCTURE: CPT

## 2020-08-20 PROCEDURE — 86800 THYROGLOBULIN ANTIBODY: CPT | Mod: 90

## 2020-08-20 PROCEDURE — 82565 ASSAY OF CREATININE: CPT

## 2020-08-20 PROCEDURE — 99000 SPECIMEN HANDLING OFFICE-LAB: CPT

## 2020-08-20 PROCEDURE — 84432 ASSAY OF THYROGLOBULIN: CPT | Mod: 90

## 2020-08-20 PROCEDURE — 82533 TOTAL CORTISOL: CPT

## 2020-08-20 PROCEDURE — 83516 IMMUNOASSAY NONANTIBODY: CPT

## 2020-08-21 LAB
TTG IGA SER-ACNC: 1 U/ML
TTG IGG SER-ACNC: <1 U/ML

## 2020-08-26 LAB — LAB SCANNED RESULT: NORMAL

## 2020-09-24 ENCOUNTER — MYC MEDICAL ADVICE (OUTPATIENT)
Dept: ENDOCRINOLOGY | Facility: CLINIC | Age: 30
End: 2020-09-24

## 2020-09-24 DIAGNOSIS — E89.2 HYPOPARATHYROIDISM AFTER PROCEDURE (H): Primary | ICD-10-CM

## 2020-09-24 RX ORDER — CALCITRIOL 0.25 UG/1
0.25 CAPSULE, LIQUID FILLED ORAL 2 TIMES DAILY
Qty: 60 CAPSULE | Refills: 11 | Status: SHIPPED | OUTPATIENT
Start: 2020-09-24 | End: 2020-10-27

## 2020-09-24 NOTE — TELEPHONE ENCOUNTER
"Order pended to provider for clarification on dispense.   Gely Solorio RN on 9/24/2020 at 12:47 PM     Clinic notes 08/10/2020: \"Will start by reducing calcitriol from 0.5 to 0.25 mcg/day.\"    RE    Wanda Swanson Lynn A, MD 23 minutes ago (12:20 PM)          Are you able to order my calcitrol?  I'm out of refills and have 2 pills left.  Thank you!  -Wanda       Outpatient Medication Detail      Disp  Refills  Start  End  SCOTTIE    calcitRIOL (ROCALTROL) 0.25 MCG capsule    8/10/2020   No    Sig - Route: Take 2 capsules (0.5 mcg) by mouth daily - Oral    Class: Historical    Order: [id number]        "

## 2020-10-26 DIAGNOSIS — E89.0 POST-SURGICAL HYPOTHYROIDISM: ICD-10-CM

## 2020-10-26 DIAGNOSIS — C73 PAPILLARY THYROID CARCINOMA (H): ICD-10-CM

## 2020-10-26 DIAGNOSIS — E89.2 HYPOPARATHYROIDISM AFTER PROCEDURE (H): ICD-10-CM

## 2020-10-26 LAB
CALCIUM SERPL-MCNC: 9.7 MG/DL (ref 8.5–10.1)
CREAT SERPL-MCNC: 1.05 MG/DL (ref 0.52–1.04)
GFR SERPL CREATININE-BSD FRML MDRD: 71 ML/MIN/{1.73_M2}
PHOSPHATE SERPL-MCNC: 4.1 MG/DL (ref 2.5–4.5)

## 2020-10-26 PROCEDURE — 82565 ASSAY OF CREATININE: CPT

## 2020-10-26 PROCEDURE — 82310 ASSAY OF CALCIUM: CPT

## 2020-10-26 PROCEDURE — 84100 ASSAY OF PHOSPHORUS: CPT

## 2020-10-27 DIAGNOSIS — E89.2 HYPOPARATHYROIDISM AFTER PROCEDURE (H): ICD-10-CM

## 2020-10-27 RX ORDER — CALCITRIOL 0.25 UG/1
0.25 CAPSULE, LIQUID FILLED ORAL DAILY
Qty: 90 CAPSULE | Refills: 3 | Status: SHIPPED | OUTPATIENT
Start: 2020-10-27 | End: 2021-03-10

## 2020-11-27 DIAGNOSIS — C73 PAPILLARY THYROID CARCINOMA (H): Primary | ICD-10-CM

## 2020-11-27 DIAGNOSIS — E89.0 POST-SURGICAL HYPOTHYROIDISM: ICD-10-CM

## 2020-12-07 ENCOUNTER — ANCILLARY PROCEDURE (OUTPATIENT)
Dept: ULTRASOUND IMAGING | Facility: CLINIC | Age: 30
End: 2020-12-07
Payer: COMMERCIAL

## 2020-12-07 DIAGNOSIS — C73 PAPILLARY THYROID CARCINOMA (H): ICD-10-CM

## 2020-12-07 DIAGNOSIS — E89.0 POST-SURGICAL HYPOTHYROIDISM: ICD-10-CM

## 2020-12-07 PROCEDURE — 76536 US EXAM OF HEAD AND NECK: CPT | Mod: GC | Performed by: STUDENT IN AN ORGANIZED HEALTH CARE EDUCATION/TRAINING PROGRAM

## 2020-12-07 NOTE — PROGRESS NOTES
Review of images on PACS  12/7/2020 neck US as read by me:   Right level 3 # 1 0.8 x 0.5 x 1.8 Echogenic hilum  Right level 4 # 1 1 x 0.5 x 0.8 cm Echogenic hilum  Left level 3 # 1 0.5 x 0.5 x 0.8 cm   Left level 4 # 1 1.1 x 0.5 x 1.2 Echogenic hilum  Cine does not show any abnormal swelling of the neck level 6/7 or 2-4  Right level 5 superficial LNS were not identified or measured on still images .        EXAMINATION: US HEAD NECK SOFT TISSUE, 12/7/2020 10:00 AM      COMPARISON: None.     HISTORY: Papillary thyroid cancer.  Treated with total Tx and limited  LN dissection, 131I 50 mCi.      TECHNIQUE: Sonographic imaging performed of the neck to evaluate for  lymph nodes using grey scale and limited Doppler technique.     FINDINGS:     Lymph nodes are measured bilaterally with measurements given in  transverse, AP, and craniocaudal dimensions as follows:     Right:     Level 2: Three lymph nodes visualized at this level of which  demonstrate normal reniform configuration and preserved echogenic  fatty hilum. The nodes measure 1.0 x 0.5 x 0.8 cm, 0.9 x 0.5 x 0.9 cm,  and 1.0 x 0.5 x 1.0 cm.  Level 3: 0.8 x 0.5 x 1.8 cm reniform lymph node with preserved  echogenic fatty hilum  Level 4: 1.0 x 0.5 x 0.8 cm reniform lymph node with preserved  echogenic fatty hilum.  Level 5: None  Level 6: Thyroidectomy surgical bed.  Level 7: None     Left:     Level 2: 1.1 x 0.7 x 2.2 cm reniform lymph node with echogenic fatty  hilum.  Level 3: 0.5 x 0.5 x 0.8 cm reniform lymph node. Fatty hilum is not  definitively visualized, likely due to small size of the lymph node.  Level 4: 1.1 x 0.5 x 1.2 cm reniform lymph node with echogenic fatty  hilum.  Level 5: None  Level 6: Thyroidectomy surgical bed.  Level 7: None                                                                      IMPRESSION:  Soft tissue neck ultrasound with lymph node measurements  as described above. Normal lymph node morphology.     I have personally reviewed  the examination and initial interpretation  and I agree with the findings.     JUSTICE JUSTICE MD

## 2021-01-04 ENCOUNTER — HEALTH MAINTENANCE LETTER (OUTPATIENT)
Age: 31
End: 2021-01-04

## 2021-03-10 DIAGNOSIS — E89.2 HYPOPARATHYROIDISM AFTER PROCEDURE (H): ICD-10-CM

## 2021-03-10 RX ORDER — CALCITRIOL 0.25 UG/1
0.25 CAPSULE, LIQUID FILLED ORAL DAILY
Qty: 90 CAPSULE | Refills: 4 | Status: SHIPPED | OUTPATIENT
Start: 2021-03-10

## 2021-03-10 RX ORDER — CALCITRIOL 0.25 UG/1
0.25 CAPSULE, LIQUID FILLED ORAL DAILY
Qty: 90 CAPSULE | Refills: 4 | OUTPATIENT
Start: 2021-03-10

## 2021-03-12 ENCOUNTER — TELEPHONE (OUTPATIENT)
Dept: ENDOCRINOLOGY | Facility: CLINIC | Age: 31
End: 2021-03-12

## 2021-03-12 NOTE — TELEPHONE ENCOUNTER
Please call her pharmacy and reinfoce that they cannot allow her to be without calcitriol.  She ran out because she used it wrong before but she needs it now.  She is out.  They should fill my rx.  Ethel Bailon MD

## 2021-03-12 NOTE — TELEPHONE ENCOUNTER
Pharmacy was able to an override and  Will have her Calcitriol ready for . Wanda was notified of this. Marilou Christine RN on 3/12/2021 at 4:54 PM

## 2021-03-30 DIAGNOSIS — E89.2 HYPOPARATHYROIDISM AFTER PROCEDURE (H): ICD-10-CM

## 2021-03-30 DIAGNOSIS — E89.2 HYPOPARATHYROIDISM AFTER PROCEDURE (H): Primary | ICD-10-CM

## 2021-03-30 LAB
CALCIUM SERPL-MCNC: 8.7 MG/DL (ref 8.5–10.1)
CREAT SERPL-MCNC: 1.28 MG/DL (ref 0.52–1.04)
GFR SERPL CREATININE-BSD FRML MDRD: 56 ML/MIN/{1.73_M2}
PHOSPHATE SERPL-MCNC: 4 MG/DL (ref 2.5–4.5)

## 2021-03-30 PROCEDURE — 82310 ASSAY OF CALCIUM: CPT

## 2021-03-30 PROCEDURE — 84100 ASSAY OF PHOSPHORUS: CPT

## 2021-03-30 PROCEDURE — 82565 ASSAY OF CREATININE: CPT

## 2021-03-30 PROCEDURE — 36415 COLL VENOUS BLD VENIPUNCTURE: CPT

## 2021-04-01 DIAGNOSIS — R20.2 PARESTHESIAS: ICD-10-CM

## 2021-04-01 DIAGNOSIS — R20.2 PARESTHESIAS: Primary | ICD-10-CM

## 2021-04-01 LAB
CALCIUM SERPL-MCNC: 9.8 MG/DL (ref 8.5–10.1)
CREAT SERPL-MCNC: 1.29 MG/DL (ref 0.52–1.04)
GFR SERPL CREATININE-BSD FRML MDRD: 55 ML/MIN/{1.73_M2}
MAGNESIUM SERPL-MCNC: 2.2 MG/DL (ref 1.6–2.3)
PHOSPHATE SERPL-MCNC: 5.2 MG/DL (ref 2.5–4.5)

## 2021-04-01 PROCEDURE — 83735 ASSAY OF MAGNESIUM: CPT | Performed by: PATHOLOGY

## 2021-04-01 PROCEDURE — 36415 COLL VENOUS BLD VENIPUNCTURE: CPT | Performed by: PATHOLOGY

## 2021-04-01 PROCEDURE — 82310 ASSAY OF CALCIUM: CPT | Performed by: PATHOLOGY

## 2021-04-01 PROCEDURE — 84100 ASSAY OF PHOSPHORUS: CPT | Performed by: PATHOLOGY

## 2021-04-01 PROCEDURE — 82565 ASSAY OF CREATININE: CPT | Performed by: PATHOLOGY

## 2021-10-11 ENCOUNTER — HEALTH MAINTENANCE LETTER (OUTPATIENT)
Age: 31
End: 2021-10-11

## 2022-01-30 ENCOUNTER — HEALTH MAINTENANCE LETTER (OUTPATIENT)
Age: 32
End: 2022-01-30

## 2022-09-24 ENCOUNTER — HEALTH MAINTENANCE LETTER (OUTPATIENT)
Age: 32
End: 2022-09-24

## 2023-01-29 ENCOUNTER — HEALTH MAINTENANCE LETTER (OUTPATIENT)
Age: 33
End: 2023-01-29

## 2024-02-25 ENCOUNTER — HEALTH MAINTENANCE LETTER (OUTPATIENT)
Age: 34
End: 2024-02-25

## 2024-03-21 ENCOUNTER — LAB REQUISITION (OUTPATIENT)
Dept: LAB | Facility: CLINIC | Age: 34
End: 2024-03-21
Payer: COMMERCIAL

## 2024-03-21 DIAGNOSIS — N92.0 EXCESSIVE AND FREQUENT MENSTRUATION WITH REGULAR CYCLE: ICD-10-CM

## 2024-03-21 DIAGNOSIS — Z01.419 ENCOUNTER FOR GYNECOLOGICAL EXAMINATION (GENERAL) (ROUTINE) WITHOUT ABNORMAL FINDINGS: ICD-10-CM

## 2024-03-21 PROCEDURE — 82728 ASSAY OF FERRITIN: CPT | Mod: ORL | Performed by: OBSTETRICS & GYNECOLOGY

## 2024-03-21 PROCEDURE — G0145 SCR C/V CYTO,THINLAYER,RESCR: HCPCS | Mod: ORL | Performed by: OBSTETRICS & GYNECOLOGY

## 2024-03-21 PROCEDURE — 87624 HPV HI-RISK TYP POOLED RSLT: CPT | Mod: ORL | Performed by: OBSTETRICS & GYNECOLOGY

## 2024-03-22 LAB — FERRITIN SERPL-MCNC: 82 NG/ML (ref 6–175)

## 2024-03-27 LAB
BKR LAB AP GYN ADEQUACY: NORMAL
BKR LAB AP GYN INTERPRETATION: NORMAL
BKR LAB AP HPV REFLEX: NORMAL
BKR LAB AP LMP: NORMAL
BKR LAB AP PREVIOUS ABNL DX: NORMAL
BKR LAB AP PREVIOUS ABNORMAL: NORMAL
PATH REPORT.COMMENTS IMP SPEC: NORMAL
PATH REPORT.COMMENTS IMP SPEC: NORMAL
PATH REPORT.RELEVANT HX SPEC: NORMAL

## 2024-03-31 LAB
HUMAN PAPILLOMA VIRUS 16 DNA: NEGATIVE
HUMAN PAPILLOMA VIRUS 18 DNA: NEGATIVE
HUMAN PAPILLOMA VIRUS FINAL DIAGNOSIS: NORMAL
HUMAN PAPILLOMA VIRUS OTHER HR: NEGATIVE

## 2024-09-27 ENCOUNTER — LAB REQUISITION (OUTPATIENT)
Dept: LAB | Facility: CLINIC | Age: 34
End: 2024-09-27
Payer: COMMERCIAL

## 2024-09-27 DIAGNOSIS — Z76.89 PERSONS ENCOUNTERING HEALTH SERVICES IN OTHER SPECIFIED CIRCUMSTANCES: ICD-10-CM

## 2024-09-27 PROCEDURE — 88305 TISSUE EXAM BY PATHOLOGIST: CPT | Mod: TC,ORL | Performed by: OBSTETRICS & GYNECOLOGY

## 2024-10-02 LAB
PATH REPORT.COMMENTS IMP SPEC: NORMAL
PATH REPORT.COMMENTS IMP SPEC: NORMAL
PATH REPORT.FINAL DX SPEC: NORMAL
PATH REPORT.GROSS SPEC: NORMAL
PATH REPORT.MICROSCOPIC SPEC OTHER STN: NORMAL
PATH REPORT.RELEVANT HX SPEC: NORMAL
PHOTO IMAGE: NORMAL

## 2024-10-02 PROCEDURE — 88305 TISSUE EXAM BY PATHOLOGIST: CPT | Mod: 26 | Performed by: PATHOLOGY
